# Patient Record
Sex: MALE | Race: WHITE | NOT HISPANIC OR LATINO
[De-identification: names, ages, dates, MRNs, and addresses within clinical notes are randomized per-mention and may not be internally consistent; named-entity substitution may affect disease eponyms.]

---

## 2024-08-01 PROBLEM — Z00.00 ENCOUNTER FOR PREVENTIVE HEALTH EXAMINATION: Status: ACTIVE | Noted: 2024-08-01

## 2024-08-28 ENCOUNTER — APPOINTMENT (OUTPATIENT)
Dept: ORTHOPEDIC SURGERY | Facility: CLINIC | Age: 34
End: 2024-08-28
Payer: COMMERCIAL

## 2024-08-28 VITALS — BODY MASS INDEX: 28 KG/M2 | HEIGHT: 71 IN | WEIGHT: 200 LBS

## 2024-08-28 DIAGNOSIS — S39.012A STRAIN OF MUSCLE, FASCIA AND TENDON OF LOWER BACK, INITIAL ENCOUNTER: ICD-10-CM

## 2024-08-28 PROCEDURE — 72110 X-RAY EXAM L-2 SPINE 4/>VWS: CPT

## 2024-08-28 PROCEDURE — 99204 OFFICE O/P NEW MOD 45 MIN: CPT

## 2024-08-28 PROCEDURE — 72070 X-RAY EXAM THORAC SPINE 2VWS: CPT

## 2024-08-28 RX ORDER — MELOXICAM 15 MG/1
15 TABLET ORAL DAILY
Qty: 21 | Refills: 0 | Status: ACTIVE | COMMUNITY
Start: 2024-08-28 | End: 1900-01-01

## 2024-08-28 NOTE — HISTORY OF PRESENT ILLNESS
[Mid-back] : mid-back [Lower back] : lower back [Radiating] : radiating [Sharp] : sharp [Shooting] : shooting [Intermittent] : intermittent [de-identified] : 8/28/2024:33 year old male presents today with complaints of chronic mid to low back pain. He reports symptoms are intermittent that can be aggravated when laying down or after work. Patient is [employer] and with equipment and belt can lead to symptoms. He denies radicular pain, n/t.   No PmHx, NKDA [employer]  [] : no

## 2024-08-28 NOTE — IMAGING
[de-identified] : Spine: Inspection/Palpation: No tenderness to palpation throughout Cervical/thoracic/lumbar spine. No bony stepoffs, No lesions.  Gait: Non-antalgic, able to perform bilateral toe and heel rise.    Range of Motion: Lumbar Spine: Flexion to 90 degrees, Extension to 30 degrees,  Neurologic:  Bilateral Lower Extremities 5/5 Iliopsoas/Quadriceps/Hamstrings/ Tibialis Anterior/ Gastrocnemius. Extensor Hallucis Longus/ Flexor Hallucis Longus except    Sensation intact to light touch L2-S1  X-ray Ap/Lateral/Flexion/Extension of lumbar spine were viewed and interpreted.  Normal alignment is maintained without any spondylolisthesis. mild scoliosis. 11.1 degree   x-ray ap/lateral thoracic spine no fractures. normal alignment.

## 2024-08-28 NOTE — ASSESSMENT
[FreeTextEntry1] :  33 year old male with lumbar strain PT Meloxicam prn  NSAIDS Discussed. Risks include stomach irritation, with hypertension risk to raise blood pressure in people with hypertension. Risks to live and kidney all explained. Diabetics increased risk to kidneys. Explained that NSAIDS used long term must follow at least twice a year. Liver and kidney function blood tests.